# Patient Record
Sex: FEMALE | URBAN - METROPOLITAN AREA
[De-identification: names, ages, dates, MRNs, and addresses within clinical notes are randomized per-mention and may not be internally consistent; named-entity substitution may affect disease eponyms.]

---

## 2023-02-15 ENCOUNTER — APPOINTMENT (RX ONLY)
Dept: URBAN - METROPOLITAN AREA CLINIC 113 | Facility: CLINIC | Age: 38
Setting detail: DERMATOLOGY
End: 2023-02-15

## 2023-02-15 DIAGNOSIS — L23.89 ALLERGIC CONTACT DERMATITIS DUE TO OTHER AGENTS: ICD-10-CM

## 2023-02-15 DIAGNOSIS — L90.5 SCAR CONDITIONS AND FIBROSIS OF SKIN: ICD-10-CM

## 2023-02-15 PROCEDURE — ? PHOTO-DOCUMENTATION

## 2023-02-15 PROCEDURE — 99213 OFFICE O/P EST LOW 20 MIN: CPT

## 2023-02-15 PROCEDURE — ? DIAGNOSIS COMMENT

## 2023-02-15 PROCEDURE — ? ADDITIONAL NOTES

## 2023-02-15 PROCEDURE — ? COUNSELING

## 2023-02-15 PROCEDURE — ? GENTLE SKIN CARE INSTRUCTIONS

## 2023-02-15 PROCEDURE — ? TREATMENT REGIMEN

## 2023-02-15 ASSESSMENT — LOCATION DETAILED DESCRIPTION DERM
LOCATION DETAILED: RIGHT MEDIAL SUPERIOR EYELID
LOCATION DETAILED: RIGHT PROXIMAL DORSAL FOREARM

## 2023-02-15 ASSESSMENT — LOCATION SIMPLE DESCRIPTION DERM
LOCATION SIMPLE: RIGHT FOREARM
LOCATION SIMPLE: RIGHT SUPERIOR EYELID

## 2023-02-15 ASSESSMENT — LOCATION ZONE DERM
LOCATION ZONE: EYELID
LOCATION ZONE: ARM

## 2023-02-15 NOTE — PROCEDURE: ADDITIONAL NOTES
Render Risk Assessment In Note?: no
Additional Notes: Patient reports worsening redness and swelling of eyelids and face after starting to use Carmen moisturizer. Photos were viewed on phone. Since d/c Carmen, rash has resolved. Informed patient that she likely has an allergy to moisturizer and to stop using. Recommend VaniCream products instead. Discussed that if rash recurs despite using Vanicream, we will order patch testing to identify potential allergens.
Detail Level: Simple

## 2023-02-15 NOTE — PROCEDURE: DIAGNOSIS COMMENT
Comment: Suspect Carmen Cream
Detail Level: Zone
Render Risk Assessment In Note?: no
Comment: Secondary to burn from cooking.

## 2023-02-15 NOTE — HPI: RASH
What Type Of Note Output Would You Prefer (Optional)?: Bullet Format
How Severe Is Your Rash?: moderate
Is This A New Presentation, Or A Follow-Up?: Rash
Additional History: Patient is not flaring today. She states she saw a correlation after using Carmen cream on her face. She now stopped using cream but would like recommendations in case a rash like this reoccurs, she has pictures on he phone.

## 2023-02-15 NOTE — HPI: SCAR
Is This A New Presentation, Or A Follow-Up?: Scar
How Severe Is Your Scar?: moderate
Additional History: Patient  states scar is from burning herself when cooking [place] in November. She uses scar sheets (premium medical silicone) when she remembers. She would like treatment recommendations for scar darkness.

## 2023-02-15 NOTE — PROCEDURE: TREATMENT REGIMEN
Initiate Treatment: -\\n- daily sun protection
Continue Regimen: -\\n- silicone gel sheets
Plan: Future considerations to include bleaching cream\\nFollow up in 2 months
Detail Level: Zone
Initiate Treatment: -\\n- OTC VaniCream products
Plan: -\\n- discussed patch testing if reaction continues with other products
Discontinue Regimen: -\\n- OTC Carmen Cream